# Patient Record
Sex: FEMALE | Race: WHITE | NOT HISPANIC OR LATINO
[De-identification: names, ages, dates, MRNs, and addresses within clinical notes are randomized per-mention and may not be internally consistent; named-entity substitution may affect disease eponyms.]

---

## 2024-02-09 ENCOUNTER — APPOINTMENT (OUTPATIENT)
Dept: UROLOGY | Facility: CLINIC | Age: 79
End: 2024-02-09
Payer: MEDICARE

## 2024-02-09 VITALS
HEIGHT: 71 IN | HEART RATE: 78 BPM | BODY MASS INDEX: 19.6 KG/M2 | SYSTOLIC BLOOD PRESSURE: 117 MMHG | WEIGHT: 140 LBS | DIASTOLIC BLOOD PRESSURE: 51 MMHG | OXYGEN SATURATION: 97 % | RESPIRATION RATE: 16 BRPM

## 2024-02-09 DIAGNOSIS — Z82.49 FAMILY HISTORY OF ISCHEMIC HEART DISEASE AND OTHER DISEASES OF THE CIRCULATORY SYSTEM: ICD-10-CM

## 2024-02-09 DIAGNOSIS — R35.0 FREQUENCY OF MICTURITION: ICD-10-CM

## 2024-02-09 DIAGNOSIS — N39.41 URGE INCONTINENCE: ICD-10-CM

## 2024-02-09 DIAGNOSIS — R33.9 RETENTION OF URINE, UNSPECIFIED: ICD-10-CM

## 2024-02-09 DIAGNOSIS — Z78.9 OTHER SPECIFIED HEALTH STATUS: ICD-10-CM

## 2024-02-09 DIAGNOSIS — N39.45 CONTINUOUS LEAKAGE: ICD-10-CM

## 2024-02-09 DIAGNOSIS — Z63.4 DISAPPEARANCE AND DEATH OF FAMILY MEMBER: ICD-10-CM

## 2024-02-09 PROBLEM — Z00.00 ENCOUNTER FOR PREVENTIVE HEALTH EXAMINATION: Status: ACTIVE | Noted: 2024-02-09

## 2024-02-09 PROCEDURE — 99204 OFFICE O/P NEW MOD 45 MIN: CPT

## 2024-02-09 PROCEDURE — 51798 US URINE CAPACITY MEASURE: CPT

## 2024-02-09 RX ORDER — ESTRADIOL 0.1 MG/G
0.1 CREAM VAGINAL
Qty: 1 | Refills: 2 | Status: ACTIVE | COMMUNITY
Start: 2024-02-09 | End: 1900-01-01

## 2024-02-09 SDOH — SOCIAL STABILITY - SOCIAL INSECURITY: DISSAPEARANCE AND DEATH OF FAMILY MEMBER: Z63.4

## 2024-02-09 NOTE — HISTORY OF PRESENT ILLNESS
[FreeTextEntry1] : Name ROSALIO POWELL  MRN [id number]   Aug  6 1945  Contact Number: [phone] ----------------------------------------------------------------------------------------------------------------------------------------- Date of First Visit: 24 Referring Provider/PCP: Dr. Alex Jin f: [phone] -----------------------------------------------------------------------------------------------------------------------------------------  CC: urinary urgency, urinary incontinence  History of Present Illness: ROSALIO POWELL is a 78 year old female who presents for evaluation of incontinence, has gotten progressively worse over past decade. Patient reports significant urgency, not every day. Feels the need to go and then will need to immediately go to the bathroom. Has to wear pads daily - sometimes uses 1 pad a day and dry, other times will need 3 pads. Also reports leakage with standing when bladder is full or when feels urge. Sometimes has leakage with exertion. More bothersome is urge incontinence. Has limited fluids as a result. Some days better than others. Worse with tea. Feels like empties bladder completely. Nocturia 1x - will feel strong urgency and when stands up will have leakage. Usually will have small volume incontinence and then will make it in time but not always. Normal stream. No hesitancy.  No history of recurrent UTIs - has had occasional in the past. No history of hematuria.   Bladder triggers: caffeine 1/2 caf once a day, carbonation a few times a week, occasional citrus, no spicy foods, moderate tomatoes, occasional pineapple, occasional alcohol, no smoking   Comorbid conditions: no neurologic diseases, rare constipation, no uncontrolled DM, no pelvic pain, no prolapse    PVR (to ensure adequate emptying): 0  PMH: ankylosing spondylitis, asthma, aflutter resolved PSH: tonsillectomy, orthopedic surgery Family History: no  malignancies Social: , no children, owns and manages properties, never smoker, rare alcohol, no recreational drugs Meds: xarelto, vitamin Allergies: NKDA ROS: no fevers, chills, flank pain ----------------------------------------------------------------------------------------------------------------------------------------- Labs:  24: UA nitrite neg LE neg blood negative 0-2 RBC 0-5 WBC  23: Cr 0.8 UA nitrite neg LE neg blood negative 0-2 RBC 0-5 WBC

## 2024-02-09 NOTE — ASSESSMENT
[FreeTextEntry1] : 78 year old with OAB, urge incontinence, and mild stress incontinence.  OAB is a clinical diagnosis characterized by the presence of bothersome urinary symptoms. It is a symptom complex with a variable and chronic course that needs to be managed over time, that it primarily affects QOL, that there is no single ideal treatment and that available treatments vary in the effort required from the patient as well as in invasiveness, risk of adverse events and reversibility. First line treatment is behavioral modifications: bladder training with pelvic floor physical therapy, fluid management, caffeine reduction, dietary changes (avoiding bladder irritants). Next line oral anti-muscarinics or oral 3-adrenoceptor agonists. Third line PTNS and neuromodulation. Fourth line more invasive surgical treatment. With regards to stress incontinence, reports less bothersome leakage with exertion. Discussed role of PT, vaginal pessary, and surgery. Patient would like to start with conservative approach.  - UA - outside reviewed and wnl, not needed - avoidance bladder triggers - bladder training - pelvic floor PT - role of vaginal estrogen discussed and use - prescribed - would like to hold off on meds and more invasive options at this time - fu 3 months

## 2024-02-09 NOTE — LETTER BODY
[Dear  ___] : Dear  [unfilled], [Courtesy Letter:] : I had the pleasure of seeing your patient, [unfilled], in my office today. [Please see my note below.] : Please see my note below. [Consult Closing:] : Thank you very much for allowing me to participate in the care of this patient.  If you have any questions, please do not hesitate to contact me. [Sincerely,] : Sincerely, [FreeTextEntry3] : Emily Zhang MD Director of Robotic Education [facility]   [email or web address] [phone]

## 2024-05-15 ENCOUNTER — APPOINTMENT (OUTPATIENT)
Dept: UROLOGY | Facility: CLINIC | Age: 79
End: 2024-05-15
Payer: MEDICARE

## 2024-05-15 VITALS
DIASTOLIC BLOOD PRESSURE: 66 MMHG | HEART RATE: 74 BPM | SYSTOLIC BLOOD PRESSURE: 135 MMHG | RESPIRATION RATE: 16 BRPM | OXYGEN SATURATION: 97 % | BODY MASS INDEX: 19.76 KG/M2 | WEIGHT: 138 LBS | HEIGHT: 70 IN

## 2024-05-15 PROCEDURE — 99213 OFFICE O/P EST LOW 20 MIN: CPT

## 2024-05-15 NOTE — ASSESSMENT
[FreeTextEntry1] : 78 year old with OAB, urge incontinence, and mild stress incontinence.  OAB is a clinical diagnosis characterized by the presence of bothersome urinary symptoms. It is a symptom complex with a variable and chronic course that needs to be managed over time, that it primarily affects QOL, that there is no single ideal treatment and that available treatments vary in the effort required from the patient as well as in invasiveness, risk of adverse events and reversibility. First line treatment is behavioral modifications: bladder training with pelvic floor physical therapy, fluid management, caffeine reduction, dietary changes (avoiding bladder irritants). Next line oral anti-muscarinics or oral 3-adrenoceptor agonists. Third line PTNS and neuromodulation. Fourth line more invasive surgical treatment. With regards to stress incontinence, reports less bothersome leakage with exertion. Discussed role of PT, vaginal pessary, and surgery. Patient would like to start with conservative approach.  Conservative approaches + vaginal estrogen were working, but in context of other medical work-up reports stopped and had recurrence of symptoms. Wants to retry estrogen and initiate pelvic floor PT.   - avoidance bladder triggers - bladder training - pelvic floor PT - vaginal estrogen  - would like to hold off on meds and more invasive options at this time but will call me if would like to trial beta agonist - fu 3 months

## 2024-05-15 NOTE — LETTER BODY
[Dear  ___] : Dear  [unfilled], [Courtesy Letter:] : I had the pleasure of seeing your patient, [unfilled], in my office today. [Please see my note below.] : Please see my note below. [Consult Closing:] : Thank you very much for allowing me to participate in the care of this patient.  If you have any questions, please do not hesitate to contact me. [Sincerely,] : Sincerely, [FreeTextEntry3] : Emily Zhang MD Director of Robotic Education [facility] at [facility]   [email or web address] [phone]

## 2024-05-15 NOTE — HISTORY OF PRESENT ILLNESS
[FreeTextEntry1] : Name ROSALIO POWELL MRN [id number]  Aug 6 1945 Contact Number: [phone] ----------------------------------------------------------------------------------------------------------------------------------------- Date of First Visit: 24 Referring Provider/PCP: Dr. Alex Jin f: [phone] -----------------------------------------------------------------------------------------------------------------------------------------  CC: urinary urgency, urinary incontinence  History of Present Illness: ROSALIO POWELL is a 78 year old female who presents for evaluation of incontinence, has gotten progressively worse over past decade. Patient reports significant urgency, not every day. Feels the need to go and then will need to immediately go to the bathroom. Has to wear pads daily - sometimes uses 1 pad a day and dry, other times will need 3 pads. Also reports leakage with standing when bladder is full or when feels urge. Sometimes has leakage with exertion. More bothersome is urge incontinence. Has limited fluids as a result. Some days better than others. Worse with tea. Feels like empties bladder completely. Nocturia 1x - will feel strong urgency and when stands up will have leakage. Usually will have small volume incontinence and then will make it in time but not always. Normal stream. No hesitancy.  No history of recurrent UTIs - has had occasional in the past. No history of hematuria.  Bladder triggers: caffeine 1/2 caf once a day, carbonation a few times a week, occasional citrus, no spicy foods, moderate tomatoes, occasional pineapple, occasional alcohol, no smoking  Comorbid conditions: no neurologic diseases, rare constipation, no uncontrolled DM, no pelvic pain, no prolapse    PVR (to ensure adequate emptying): 0 ---------------------------------------------------------------------------------------------------------------------------------------- Interval History (05/15/2024):  Last visit started vaginal estrogen and initiated conservative measures. Did not start PT yet. Patient undergoing GI work-up for abdominal bloating. During course of work-up got nervous and stopped estrogen. Was doing well initially and was pleased with urination, but then during this time of GI work-up things slipped back.   CT A/P as part of work-up: Kidneys:  8 mm simple cyst in the left midpole, not requiring further imaging follow-up. The right kidney is low-lying and rotated. The kidneys enhance symmetrically and are without hydronephrosis, nephrolithiasis or perinephric stranding. Ureters: Unremarkable  ---------------------------------------------------------------------------------------------------------------------------------------- PMH: ankylosing spondylitis, asthma, aflutter resolved PSH: tonsillectomy, orthopedic surgery Family History: no  malignancies Social: , no children, owns and manages properties, never smoker, rare alcohol, no recreational drugs Meds: xarelto, vitamin Allergies: NKDA ROS: no fevers, chills, flank pain ----------------------------------------------------------------------------------------------------------------------------------------- Labs: 24: UA nitrite neg LE neg blood negative 0-2 RBC 0-5 WBC  23: Cr 0.8 UA nitrite neg LE neg blood negative 0-2 RBC 0-5 WBC volitional

## 2024-08-14 ENCOUNTER — APPOINTMENT (OUTPATIENT)
Dept: UROLOGY | Facility: CLINIC | Age: 79
End: 2024-08-14